# Patient Record
Sex: MALE | Race: BLACK OR AFRICAN AMERICAN
[De-identification: names, ages, dates, MRNs, and addresses within clinical notes are randomized per-mention and may not be internally consistent; named-entity substitution may affect disease eponyms.]

---

## 2020-04-22 ENCOUNTER — HOSPITAL ENCOUNTER (EMERGENCY)
Dept: HOSPITAL 72 - EMR | Age: 50
Discharge: HOME | End: 2020-04-22
Payer: COMMERCIAL

## 2020-04-22 VITALS — DIASTOLIC BLOOD PRESSURE: 94 MMHG | SYSTOLIC BLOOD PRESSURE: 165 MMHG

## 2020-04-22 VITALS — WEIGHT: 175 LBS | HEIGHT: 69 IN | BODY MASS INDEX: 25.92 KG/M2

## 2020-04-22 DIAGNOSIS — M48.03: ICD-10-CM

## 2020-04-22 DIAGNOSIS — S39.012A: ICD-10-CM

## 2020-04-22 DIAGNOSIS — S16.1XXA: Primary | ICD-10-CM
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
## 2020-04-22 DIAGNOSIS — I10: ICD-10-CM

## 2020-04-22 PROCEDURE — 72020 X-RAY EXAM OF SPINE 1 VIEW: CPT

## 2020-04-22 PROCEDURE — 72125 CT NECK SPINE W/O DYE: CPT

## 2020-04-22 PROCEDURE — 99284 EMERGENCY DEPT VISIT MOD MDM: CPT
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
